# Patient Record
(demographics unavailable — no encounter records)

---

## 2025-03-17 NOTE — REVIEW OF SYSTEMS
[Anemia] : anemia [Negative] : Allergic/Immunologic [Immunizations are up to date by report] : Immunizations are up to date by report

## 2025-03-17 NOTE — REASON FOR VISIT
[New Patient/Consultation] : a new patient/consultation for [Anemia] : anemia [Iron Deficiency Anemia] : iron deficiency anemia [Mother] : mother [Family Member] : family member [Medical Records] : medical records

## 2025-03-21 NOTE — PHYSICAL EXAM
[Pallor] : pallor [No focal deficits] : no focal deficits [Normal] : affect appropriate [100: Fully active, normal.] : 100: Fully active, normal. [Icterus] : not icterus [Teeth Caries] : no teeth caries

## 2025-03-21 NOTE — HISTORY OF PRESENT ILLNESS
[___ Times/day] : [unfilled] times/day [de-identified] : Alethea is a 19 month old female born full term presenting for consultation for Iron deficiency anemia.  Her pediatrician did routine labs where her Hgb was found to be 7.8 with MCV in the 50s and elevated RDW. He did follow up testing which showed that her Ferritin was 3. Mom describes Alethea as a picky eater and drinks a lot of milk. Mom reports that if she does not give her milk, she encounters tantrums. Mom said she has been taking the iron from her pediatrician but has been giving milk within 2 hours of taking the medication. She denies hard stools, blood in the stools. nose bleeds. No PICA reported.  PMH per HPI PSH none  NKDA Meds Ferrous Sulfate [de-identified] : picky eater [de-identified] : 0

## 2025-03-21 NOTE — CONSULT LETTER
[Dear  ___] : Dear  [unfilled], [Consult Letter:] : I had the pleasure of evaluating your patient, [unfilled]. [Please see my note below.] : Please see my note below. [Consult Closing:] : Thank you very much for allowing me to participate in the care of this patient.  If you have any questions, please do not hesitate to contact me. [Sincerely,] : Sincerely, [FreeTextEntry3] : Shantell Nguyễn MD Fellow, Pediatric Hematology Oncology [facility] [address] [place] [address]

## 2025-03-21 NOTE — CONSULT LETTER
[Dear  ___] : Dear  [unfilled], [Consult Letter:] : I had the pleasure of evaluating your patient, [unfilled]. [Please see my note below.] : Please see my note below. [Consult Closing:] : Thank you very much for allowing me to participate in the care of this patient.  If you have any questions, please do not hesitate to contact me. [Sincerely,] : Sincerely, [FreeTextEntry3] : Shantell Nguyễn MD Fellow, Pediatric Hematology Oncology [facility] [address] [place] [address]

## 2025-03-21 NOTE — PAST MEDICAL HISTORY
[At Term] : at term [place] [Normal Vaginal Route] : by normal vaginal route [None] : there were no delivery complications [Age Appropriate] : age appropriate  [Pre-menarchal] : pre-menarchal [Jaundice] : not jaundice [Phototherapy] : no phototherapy [Exchange Transfusion] : no exchange transfusion [NICU] : no NICU

## 2025-03-21 NOTE — HISTORY OF PRESENT ILLNESS
[___ Times/day] : [unfilled] times/day [de-identified] : Alethea is a 19 month old female born full term presenting for consultation for Iron deficiency anemia.  Her pediatrician did routine labs where her Hgb was found to be 7.8 with MCV in the 50s and elevated RDW. He did follow up testing which showed that her Ferritin was 3. Mom describes Alethea as a picky eater and drinks a lot of milk. Mom reports that if she does not give her milk, she encounters tantrums. Mom said she has been taking the iron from her pediatrician but has been giving milk within 2 hours of taking the medication. She denies hard stools, blood in the stools. nose bleeds. No PICA reported.  PMH per HPI PSH none  NKDA Meds Ferrous Sulfate [de-identified] : picky eater [de-identified] : 0